# Patient Record
Sex: FEMALE | Race: WHITE | NOT HISPANIC OR LATINO | Employment: OTHER | URBAN - NONMETROPOLITAN AREA
[De-identification: names, ages, dates, MRNs, and addresses within clinical notes are randomized per-mention and may not be internally consistent; named-entity substitution may affect disease eponyms.]

---

## 2023-08-14 NOTE — PROGRESS NOTES
GISSEL Butterfield  [facility]   Pulmonary and Critical Care  [address] [place] [address]  Phone: [phone]  Fax: [phone]           Chief Complaint  COPD    Subjective    History of Present Illness     Laurie Aguero presents to [facility] PULMONARY & CRITICAL CARE MEDICINE   History of Present Illness  Ms. Aguero is a 78-year-old female referred by her primary care physician, Dr. Cornelius Felder for possible chronic obstructive lung disease.  She has known GERD, hypertension, hypercholesterolemia. Thoracic aortic aneurysm of 4.7 cm x 4.4cm  and growth from Nov 2021.  She has family history of father with heart disease and maternal grandmother with  stroke & diabetes, stroke and emphysema with maternal grandfather. She is a never smoker.  She had secondhand smoke exposure as child. 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  She had a recent heart cath that she reports was okay. Review of PCP notes indicate she has had cough, wheezing and shortness of breath over the last year. She was seen by a lung doctor in [place], [place] about 3 years ago who noted she had copd. She did have a PFT at that time. She is on Trelegy Ellipta and find some benefit. She has only been on this a week. She was hoarse before starting it and has not gotten better since stopping the Trelegy over the last 2 days. She does rinse/gargle with use but worried about getting thrush. She has albuterol HFA she uses very seldom with last use two weeks ago and finds benefit. It does seem to make her hyper. She noted her heart was racing and she was jittery. She is on pantoprazole for her GERD and finds benefit. She does eat/drink 2-3 hours before going to bed.  Patient is on propanolol beta-blocker and has been on this for a long time. She was on Indural prior starting at age 31 for irregular heart beat. Patient had a round of azithromycin and Medrol dose pack last month.  She did get  "some help with her morning phlegm. She has thick yellowish-green morning mucus. She has had no exacerbations over the last year.  She has had no ER/urgent care/hospitalizations over the last year.  She has brought in a disc with her most recent chest x-ray and CT of the chest. Report shows not significant lung findings. Of note, review of imaging by myself shows   Her cardiac doctor noted he felt primary lung problem but if pulmonary was normal then could be either microvascular dysfunction or restrictive cardiomyopathy. She has just started on Duloxetine and for possible fibromyalgia and anxiety.          Objective   Vital Signs:   /68   Pulse 72   Resp 16   Ht 162.6 cm (64\")   Wt 102 kg (224 lb)   SpO2 96% Comment: RA  BMI 38.45 kg/mý     Physical Exam  Vitals reviewed.   Constitutional:       Appearance: Normal appearance. She is obese.   Cardiovascular:      Rate and Rhythm: Normal rate and regular rhythm.   Pulmonary:      Effort: Pulmonary effort is normal.      Breath sounds: Normal breath sounds.   Neurological:      General: No focal deficit present.      Mental Status: She is alert and oriented to person, place, and time.   Psychiatric:         Mood and Affect: Mood normal.         Behavior: Behavior normal.        Result Review :  The following data was reviewed by: GISSEL Butterfield on 08/17/2023:    Data reviewed : Radiologic studies CXR and CT chest brought in on disc. Will review    My interpretation of imaging:  as in HPI  My interpretation of labs: none       My interpretation of the PFT : none    No results found for this or any previous visit.            Assessment and Plan   Diagnoses and all orders for this visit:    1. Dyspnea on exertion (Primary)  -     Complete PFT - Pre & Post Bronchodilator; Future    2. Wheezing    3. Chronic cough      Recommend PCP or cardiologist change her beta-blocker to a more cardioselective beta-blocker.  I have review her chest x-ray and CT " of the chest. No significant findings. She did have what looks like a calcified granuloma of the right. I will have Dr. Alexander review.  She is agreeable to have a complete pre-/post bronchodilator pulmonary function study to be done at the hospital.  She is a never smoker diagnosed with COPD prior and never smoker. Will plan to test for alpha after she has her PFT. She will obtain her prior PFT for us to compare to.     She is also provided the following:   Avoid eating drinking 2-3 hours prior to bed  Continue reflux medicine  Use Over the counter mucinex (not the Dm) for the thick mucus and take with a full glass of water   Obtain prior pulmonary function study for me to review  We will schedule for a pulmonary function study to be done at the hospital.   Hold off of Trelegy for now. Continue the rescue inhaler if needed         Follow Up   Return in about 6 weeks (around 9/28/2023) for PFT hospital complete.  Patient was given instructions and counseling regarding her condition or for health maintenance advice. Please see specific information pulled into the AVS if appropriate.     Pratibha Fitzgerald, APRN  8/17/2023  14:56 CDT

## 2023-08-16 ENCOUNTER — TELEPHONE (OUTPATIENT)
Dept: PULMONOLOGY | Facility: CLINIC | Age: 78
End: 2023-08-16
Payer: COMMERCIAL

## 2023-08-16 NOTE — TELEPHONE ENCOUNTER
Spoke to patient and she is bringing a disk with a recent chest xray and CT of the chest to her appointment tomorrow.  She states she has not had any recent pft's.

## 2023-08-17 ENCOUNTER — OFFICE VISIT (OUTPATIENT)
Dept: PULMONOLOGY | Facility: CLINIC | Age: 78
End: 2023-08-17
Payer: COMMERCIAL

## 2023-08-17 VITALS
OXYGEN SATURATION: 96 % | DIASTOLIC BLOOD PRESSURE: 68 MMHG | BODY MASS INDEX: 38.24 KG/M2 | HEART RATE: 72 BPM | SYSTOLIC BLOOD PRESSURE: 128 MMHG | RESPIRATION RATE: 16 BRPM | WEIGHT: 224 LBS | HEIGHT: 64 IN

## 2023-08-17 DIAGNOSIS — R05.3 CHRONIC COUGH: ICD-10-CM

## 2023-08-17 DIAGNOSIS — R06.2 WHEEZING: ICD-10-CM

## 2023-08-17 DIAGNOSIS — R06.09 DYSPNEA ON EXERTION: Primary | ICD-10-CM

## 2023-08-17 PROCEDURE — 99203 OFFICE O/P NEW LOW 30 MIN: CPT | Performed by: NURSE PRACTITIONER

## 2023-08-17 RX ORDER — ALBUTEROL SULFATE 90 UG/1
2 AEROSOL, METERED RESPIRATORY (INHALATION) EVERY 4 HOURS PRN
COMMUNITY

## 2023-08-17 RX ORDER — FLUTICASONE FUROATE, UMECLIDINIUM BROMIDE AND VILANTEROL TRIFENATATE 100; 62.5; 25 UG/1; UG/1; UG/1
1 POWDER RESPIRATORY (INHALATION) DAILY
COMMUNITY

## 2023-08-17 RX ORDER — TORSEMIDE 20 MG/1
TABLET ORAL
COMMUNITY

## 2023-08-17 RX ORDER — POTASSIUM CHLORIDE 750 MG/1
10 TABLET, FILM COATED, EXTENDED RELEASE ORAL DAILY
COMMUNITY

## 2023-08-17 RX ORDER — PRAVASTATIN SODIUM 80 MG/1
1 TABLET ORAL DAILY
COMMUNITY

## 2023-08-17 RX ORDER — ROPINIROLE 0.25 MG/1
1 TABLET, FILM COATED ORAL DAILY
COMMUNITY

## 2023-08-17 RX ORDER — VALSARTAN 40 MG/1
1 TABLET ORAL DAILY
COMMUNITY

## 2023-08-17 RX ORDER — FUROSEMIDE 20 MG/1
TABLET ORAL
COMMUNITY

## 2023-08-17 RX ORDER — PANTOPRAZOLE SODIUM 40 MG/1
1 TABLET, DELAYED RELEASE ORAL DAILY
COMMUNITY

## 2023-08-17 NOTE — PATIENT INSTRUCTIONS
Avoid eating drinking 2-3 hours prior to bed  Continue reflux medicine  Use Over the counter mucinex (not the Dm) for the thick mucus and take with a full glass of water   Obtain prior pulmonary function study for me to review  We will schedule for a pulmonary function study to be done at the hospital.   Hold off of Trelegy for now. Continue the rescue inhaler if needed

## 2023-08-28 ENCOUNTER — HOSPITAL ENCOUNTER (OUTPATIENT)
Dept: PULMONOLOGY | Facility: HOSPITAL | Age: 78
Discharge: HOME | End: 2023-08-28
Admitting: NURSE PRACTITIONER
Payer: COMMERCIAL

## 2023-08-28 DIAGNOSIS — R06.09 DYSPNEA ON EXERTION: ICD-10-CM

## 2023-08-28 PROCEDURE — 94060 EVALUATION OF WHEEZING: CPT

## 2023-08-28 PROCEDURE — 94726 PLETHYSMOGRAPHY LUNG VOLUMES: CPT

## 2023-08-28 PROCEDURE — 94729 DIFFUSING CAPACITY: CPT

## 2023-08-28 RX ORDER — ALBUTEROL SULFATE 2.5 MG/3ML
2.5 SOLUTION RESPIRATORY (INHALATION) ONCE
Status: COMPLETED | OUTPATIENT
Start: 2023-08-28 | End: 2023-08-28

## 2023-08-28 RX ADMIN — ALBUTEROL SULFATE 2.5 MG: 2.5 SOLUTION RESPIRATORY (INHALATION) at 13:20

## 2023-09-08 ENCOUNTER — TELEPHONE (OUTPATIENT)
Dept: PULMONOLOGY | Facility: CLINIC | Age: 78
End: 2023-09-08
Payer: COMMERCIAL

## 2023-09-08 NOTE — TELEPHONE ENCOUNTER
Pratibha Fitzgerald APRN Forrester, Heather, MA  Please let patient know Dr. Alexander did review her CT scan and the nodule we discussed is a calcified granuloma and nothing to worry about as those are regarded as benign.      Left voicemail with patient to return call.

## 2023-09-25 NOTE — PROGRESS NOTES
" GISSEL Butterfield  [facility]   Pulmonary and Critical Care  [address]  [place] [address]  Phone: [phone]  Fax: [phone]           Chief Complaint  Dyspnea on exertion    Subjective    History of Present Illness     Laurie Aguero presents to [facility] PULMONARY & CRITICAL CARE MEDICINE   History of Present Illness  Ms. Aguero is a 78-year-old female referred by her primary care physician, Dr. Cornelius Felder at last visit for possible chronic obstructive lung disease. Her pulmonary function study was normal and she got a slight worsening post bronchodilator but was still normal. She is a never smoker. She has had cough, wheezing and shortness of breath over the last year. She has seen ENT at [facility] last year for sinusitis.  Her pulmonary function study was normal and her FVC actually got a little worse post bronchodilator. I do not have the prior PFT from [place] to review/ compare. Trelegy Ellipta made her hoarse despite rinsing although at last visit she also noted Hoarseness prior to Trelegy. She uses Albuterol HFA -very seldom as it makes her heart race and jittery. She is on pantoprazole for her GERD but has only been taking it as needed when she has heart burn. She has continued to eat/drink (mostly drink) 2-3 hours before going to bed. Her cardiac doctor noted he felt primary lung problem but if pulmonary was normal then could be either microvascular dysfunction or restrictive cardiomyopathy. At last visit I had recommended PCP or cardiologist change her beta-blocker to a more cardioselective beta-blocker. Today she reports she has to  her new beta blocker.         Objective   Vital Signs:   /72   Pulse 76   Ht 162.6 cm (64\")   Wt 100 kg (221 lb)   SpO2 96% Comment: RA  BMI 37.93 kg/m²     Physical Exam  Vitals reviewed.   Constitutional:       Appearance: Normal appearance. She is obese.   Cardiovascular:      Rate and Rhythm: " Normal rate and regular rhythm.   Pulmonary:      Effort: Pulmonary effort is normal.      Breath sounds: Normal breath sounds.   Neurological:      General: No focal deficit present.      Mental Status: She is alert and oriented to person, place, and time.   Psychiatric:         Mood and Affect: Mood normal.         Behavior: Behavior normal.        Result Review :  The following data was reviewed by: GISSEL Butterfield on 09/28/2023:    My interpretation of imaging:  no new   My interpretation of labs: No new       My interpretation of the PFT : as in HPI     Results for orders placed during the hospital encounter of 08/28/23    Complete PFT - Pre & Post Bronchodilator    Narrative  [facility] - Pulmonary Function Test    [address] [place] [address]  [id number]  [phone]    Patient : Laurie Aguero  MRN : [id number]  CSN : [id number]  Pulmonologist : Kyle Alexander MD  Date : 8/28/2023    ______________________________________________________________________    Interpretation :  1.  Spirometry is within normal limits.  2.  There is actually worsening of midflows postbronchodilator although they remain within normal limits.  Otherwise there is no significant change in spirometry postbronchodilator.  3.  Lung volumes reveal an elevated expiratory reserve volume and otherwise are within normal limits.  4.  Diffusion capacity is within normal limits.      Kyle Alexander MD    Rest/Exercise Pulse Ox Values          9/28/2023    13:00   Rest/Exercise Pulse Ox Results   Rest room air SAT % 97   Exercise room air SAT % 96         Assessment and Plan   Diagnoses and all orders for this visit:    1. Chronic cough (Primary)  -     Walking Oximetry    2. Dyspnea on exertion  -     Walking Oximetry    3. Wheezing      Pulmonary function study was normal and she also had slight worsening post bronchodilator. Her O2 walk did not qualify her for oxygen. She has seen an ENT in the past however was  hesitant to see again given she did not like have the scope. Her last Endoscopy was around 5 years ago. She does not remember what it showed. She has only been taking her Protonix as needed. We discussed that reflux can be silent especially when eating/ drinking prior to laying down. This can keep her esophagus inflamed and could be contributing to her cough/ hoarseness/ wheezing. It can lead to simply having an ear ache. I advised that before going back to GI or having a repeat endoscopy she could see ENT as they can do the flexible scope in the office and look for signs of reflux. She wanted to wait and discuss with her PCP. In the meantime she is again encouraged to avoid eating/ drinking prior to bed. She is also advised she may need to go back on a daily regimen with her reflux medication. We also discussed the role her weight is playing in her shortness of breath. Her cardiac doctor noted he felt primary lung problem but if pulmonary was normal then could be either microvascular dysfunction or restrictive cardiomyopathy. I advised to return to cardiology and follow up with PCP to discuss possible referral to ENT. She will follow up with us as needed.       Follow Up   Return if symptoms worsen or fail to improve.  Patient was given instructions and counseling regarding her condition or for health maintenance advice. Please see specific information pulled into the AVS if appropriate.     Pratibha Fitzgerald, GISSEL  9/28/2023  14:08 CDT

## 2023-09-28 ENCOUNTER — OFFICE VISIT (OUTPATIENT)
Dept: PULMONOLOGY | Facility: CLINIC | Age: 78
End: 2023-09-28
Payer: COMMERCIAL

## 2023-09-28 VITALS
OXYGEN SATURATION: 96 % | BODY MASS INDEX: 37.73 KG/M2 | SYSTOLIC BLOOD PRESSURE: 100 MMHG | WEIGHT: 221 LBS | HEIGHT: 64 IN | HEART RATE: 76 BPM | DIASTOLIC BLOOD PRESSURE: 72 MMHG

## 2023-09-28 DIAGNOSIS — R05.3 CHRONIC COUGH: Primary | ICD-10-CM

## 2023-09-28 DIAGNOSIS — R06.09 DYSPNEA ON EXERTION: ICD-10-CM

## 2023-09-28 DIAGNOSIS — R06.2 WHEEZING: ICD-10-CM

## 2023-09-28 RX ORDER — METOPROLOL SUCCINATE 50 MG/1
TABLET, EXTENDED RELEASE ORAL
COMMUNITY
Start: 2023-09-26

## 2023-09-28 NOTE — PROCEDURES
Walking Oximetry  Performed by: Elvie Delaney MA  Authorized by: Pratibha Fitzgerald APRN     Supplemental Oxygen: None  Rest room air SAT %:  97  Exercise room air SAT %:  96